# Patient Record
Sex: FEMALE | Race: BLACK OR AFRICAN AMERICAN | Employment: FULL TIME | URBAN - METROPOLITAN AREA
[De-identification: names, ages, dates, MRNs, and addresses within clinical notes are randomized per-mention and may not be internally consistent; named-entity substitution may affect disease eponyms.]

---

## 2024-03-18 ENCOUNTER — HOSPITAL ENCOUNTER (EMERGENCY)
Facility: CLINIC | Age: 34
Discharge: HOME | End: 2024-03-18
Payer: COMMERCIAL

## 2024-03-18 VITALS
DIASTOLIC BLOOD PRESSURE: 96 MMHG | SYSTOLIC BLOOD PRESSURE: 134 MMHG | BODY MASS INDEX: 34.17 KG/M2 | TEMPERATURE: 98.7 F | WEIGHT: 181 LBS | OXYGEN SATURATION: 100 % | HEIGHT: 61 IN | RESPIRATION RATE: 16 BRPM | HEART RATE: 101 BPM

## 2024-03-18 DIAGNOSIS — L02.01 ABSCESS OF FOREHEAD: Primary | ICD-10-CM

## 2024-03-18 PROCEDURE — 10060 I&D ABSCESS SIMPLE/SINGLE: CPT

## 2024-03-18 PROCEDURE — 99283 EMERGENCY DEPT VISIT LOW MDM: CPT

## 2024-03-18 PROCEDURE — 6370000000 HC RX 637 (ALT 250 FOR IP): Performed by: REGISTERED NURSE

## 2024-03-18 RX ORDER — DOXYCYCLINE HYCLATE 100 MG
100 TABLET ORAL 2 TIMES DAILY
Qty: 20 TABLET | Refills: 0 | Status: SHIPPED | OUTPATIENT
Start: 2024-03-18 | End: 2024-03-28

## 2024-03-18 RX ORDER — METHOCARBAMOL 500 MG/1
500 TABLET, FILM COATED ORAL 4 TIMES DAILY PRN
COMMUNITY
Start: 2024-01-18

## 2024-03-18 RX ORDER — TRANEXAMIC ACID 650 MG/1
1300 TABLET ORAL 3 TIMES DAILY
COMMUNITY
Start: 2023-08-16

## 2024-03-18 RX ORDER — ETHYL CHLORIDE 100 %
AEROSOL, SPRAY (ML) TOPICAL PRN
Status: DISCONTINUED | OUTPATIENT
Start: 2024-03-18 | End: 2024-03-19 | Stop reason: HOSPADM

## 2024-03-18 RX ORDER — CLINDAMYCIN PHOSPHATE 10 MG/G
1 GEL TOPICAL DAILY
COMMUNITY
Start: 2024-03-07

## 2024-03-18 RX ORDER — DOXYCYCLINE 100 MG/1
100 CAPSULE ORAL ONCE
Status: COMPLETED | OUTPATIENT
Start: 2024-03-18 | End: 2024-03-18

## 2024-03-18 RX ORDER — ONDANSETRON HYDROCHLORIDE 8 MG/1
TABLET, FILM COATED ORAL
COMMUNITY

## 2024-03-18 RX ORDER — PHENTERMINE HYDROCHLORIDE 37.5 MG/1
1 TABLET ORAL DAILY
COMMUNITY
Start: 2021-06-18

## 2024-03-18 RX ORDER — FERROUS GLUCONATE 324(37.5)
324 TABLET ORAL 2 TIMES DAILY
COMMUNITY

## 2024-03-18 RX ORDER — LIDOCAINE HYDROCHLORIDE 10 MG/ML
5 INJECTION, SOLUTION INFILTRATION; PERINEURAL ONCE
Status: DISCONTINUED | OUTPATIENT
Start: 2024-03-18 | End: 2024-03-18

## 2024-03-18 RX ORDER — PROCHLORPERAZINE MALEATE 10 MG
TABLET ORAL
COMMUNITY

## 2024-03-18 RX ADMIN — DOXYCYCLINE 100 MG: 100 CAPSULE ORAL at 22:01

## 2024-03-18 ASSESSMENT — PAIN DESCRIPTION - LOCATION: LOCATION: FACE

## 2024-03-18 ASSESSMENT — PAIN - FUNCTIONAL ASSESSMENT: PAIN_FUNCTIONAL_ASSESSMENT: 0-10

## 2024-03-18 ASSESSMENT — PAIN SCALES - GENERAL: PAINLEVEL_OUTOF10: 8

## 2024-03-19 NOTE — ED PROVIDER NOTES
Pt Name: Kobe Lewis  MRN: [id number]  Birthdate 1990  Date of evaluation: 3/18/24       Kobe Lewis is a 33 y.o. female who presents with Abscess        Based on the medical record, the care appears appropriate. I was personally available for consultation in the Emergency Department.    Sid Grace III, MD  Attending Emergency  Physician        Sid Grace III, MD  03/18/24 2086

## 2024-03-19 NOTE — ED NOTES
Pt presents to ED ambulatory a&o x4. Pt states she noticed bump to forehead Friday. Pt reports over the weekend it has gotten larger in size, redness and painful. Pt has been using warm compresses at home. Denies fevers.

## 2024-03-19 NOTE — DISCHARGE INSTRUCTIONS
Please understand that at this time there is no evidence for a more serious underlying process, but that early in the process of an illness or injury, an emergency department workup can be falsely reassuring.  You should contact your family doctor within the next 48 hours for a follow up appointment    THANK YOU!!!    From [facility] and [facility]    On behalf of the Emergency Department staff at [facility], I would like to thank you for giving us the opportunity to address your health care needs and concerns.    We hope that during your visit, our service was delivered in a professional and caring manner. Please keep [facility] in mind as we walk with you down the path to your own personal wellness.     Please expect an automated text message or email from us so we can ask a few questions about your health and progress. Based on your answers, a clinician may call you back to offer help and instructions.    Please understand that early in the process of an illness or injury, an emergency department workup can be falsely reassuring.  If you notice any worsening, changing or persistent symptoms please call your family doctor or return to the ER immediately.     Tell us how we did during your visit at [email or web address]   and let us know about your experience

## 2024-03-19 NOTE — ED PROVIDER NOTES
[facility] ED  [address] [address]  Phone: [phone]        Pt Name: Kobe Lewis  MRN: [id number]  Birthdate 1990  Date of evaluation: 3/18/24    CHIEFCOMPLAINT       Chief Complaint   Patient presents with    Abscess       HISTORY OF PRESENT ILLNESS (Location/Symptom, Timing/Onset, Context/Setting, Quality, Duration, Modifying Factors, Severity)      Kobe Lewis is a 33 y.o. female with no pertinent PMH who presents to the ED via private auto with abscess to her forehead.  She has concerns on ongoing for last 4 days.  She has attempted to pop it at home with no luck.  He states it is grown in size and some discomfort.  She has not taking medications for symptoms.  She states that nothing makes her symptoms better or worse.  On arrival she is resting on the cot with even unlabored breaths and nontoxic.  No acute distress noted.    PAST MEDICAL / SURGICAL / SOCIAL / FAMILY HISTORY     PMH:  has a past medical history of Breast cancer (HCC).  Surgical History:  has a past surgical history that includes  section and IR EMBOLIZATION TUMOR/ORGAN ISCH/INFARC.  Social History:  reports that she has never smoked. She has never used smokeless tobacco. She reports current alcohol use. She reports that she does not use drugs.  Family History: has no family status information on file.    family history is not on file.  Psychiatric History: None    Allergies: Paclitaxel and Polyoxyl 35 castor oil (cremophor el) [cremophor]    Home Medications:   Prior to Admission medications    Medication Sig Start Date End Date Taking? Authorizing Provider   clindamycin 1 % gel Apply 1 Application topically daily 3/7/24  Yes ProviderSusan MD   methocarbamol (ROBAXIN) 500 MG tablet Take 1 tablet by mouth 4 times daily as needed 24  Yes ProviderSsuan MD   phentermine (ADIPEX-P) 37.5 MG tablet Take 1 tablet by mouth daily. Max Daily Amount: 37.5 mg 21  Yes Provider

## 2024-03-23 ASSESSMENT — ENCOUNTER SYMPTOMS
ROS SKIN COMMENTS: POSITIVE ABSCESS
BACK PAIN: 0
SHORTNESS OF BREATH: 0
COUGH: 0
VOMITING: 0
ABDOMINAL PAIN: 0
DIARRHEA: 0
NAUSEA: 0